# Patient Record
Sex: FEMALE | Race: WHITE | NOT HISPANIC OR LATINO | URBAN - METROPOLITAN AREA
[De-identification: names, ages, dates, MRNs, and addresses within clinical notes are randomized per-mention and may not be internally consistent; named-entity substitution may affect disease eponyms.]

---

## 2017-10-03 ENCOUNTER — APPOINTMENT (RX ONLY)
Dept: URBAN - METROPOLITAN AREA CLINIC 37 | Facility: CLINIC | Age: 27
Setting detail: DERMATOLOGY
End: 2017-10-03

## 2017-10-03 ENCOUNTER — APPOINTMENT (RX ONLY)
Dept: URBAN - METROPOLITAN AREA CLINIC 38 | Facility: CLINIC | Age: 27
Setting detail: DERMATOLOGY
End: 2017-10-03

## 2017-10-03 DIAGNOSIS — L21.8 OTHER SEBORRHEIC DERMATITIS: ICD-10-CM

## 2017-10-03 PROBLEM — J30.1 ALLERGIC RHINITIS DUE TO POLLEN: Status: ACTIVE | Noted: 2017-10-03

## 2017-10-03 PROBLEM — F41.9 ANXIETY DISORDER, UNSPECIFIED: Status: ACTIVE | Noted: 2017-10-03

## 2017-10-03 PROBLEM — K21.9 GASTRO-ESOPHAGEAL REFLUX DISEASE WITHOUT ESOPHAGITIS: Status: ACTIVE | Noted: 2017-10-03

## 2017-10-03 PROBLEM — L40.0 PSORIASIS VULGARIS: Status: ACTIVE | Noted: 2017-10-03

## 2017-10-03 PROBLEM — L20.84 INTRINSIC (ALLERGIC) ECZEMA: Status: ACTIVE | Noted: 2017-10-03

## 2017-10-03 PROBLEM — F32.9 MAJOR DEPRESSIVE DISORDER, SINGLE EPISODE, UNSPECIFIED: Status: ACTIVE | Noted: 2017-10-03

## 2017-10-03 PROCEDURE — ? COUNSELING

## 2017-10-03 PROCEDURE — ? PRESCRIPTION

## 2017-10-03 PROCEDURE — ? ORDER TESTS

## 2017-10-03 PROCEDURE — 99201: CPT

## 2017-10-03 PROCEDURE — ? TREATMENT REGIMEN

## 2017-10-03 RX ORDER — KETOCONAZOLE 20 MG/G
CREAM TOPICAL TWICE DAILY
Qty: 1 | Refills: 2 | Status: ERX | COMMUNITY
Start: 2017-10-03

## 2017-10-03 RX ORDER — FLUOCINONIDE 0.5 MG/ML
CREAM TOPICAL TWICE DAILY
Qty: 1 | Refills: 2 | Status: ERX | COMMUNITY
Start: 2017-10-03

## 2017-10-03 RX ADMIN — KETOCONAZOLE: 20 CREAM TOPICAL at 00:00

## 2017-10-03 RX ADMIN — FLUOCINONIDE: 0.5 CREAM TOPICAL at 00:00

## 2017-10-03 NOTE — PROCEDURE: TREATMENT REGIMEN
Discontinue Regimen: Betamethasone dipropionate cream (prescribed by an outside physician)
Initiate Treatment: Ketoconazole 2% cream apply topically to the affected areas of the ears twice daily\\nFluocinonide 0.05% cream apply to the affected areas of the ears twice daily
Plan: Follow up in 4-6 weeks
Detail Level: Zone

## 2017-10-03 NOTE — PROCEDURE: ORDER TESTS
Performing Laboratory: -[id number]
Billing Type: Third-Party Bill
Bill For Surgical Tray: no
Expected Date Of Service: 10/03/2017
Lab Facility: [id number]

## 2017-10-03 NOTE — PROCEDURE: MIPS QUALITY
Quality 226: Preventive Care And Screening: Tobacco Use: Screening And Cessation Intervention: Patient screened for tobacco and never smoked
Quality 130: Documentation Of Current Medications In The Medical Record: Current Medications Documented
Quality 431: Preventive Care And Screening: Unhealthy Alcohol Use - Screening: Patient screened for unhealthy alcohol use using a single question and scores less than 2 times per year
Quality 110: Preventive Care And Screening: Influenza Immunization: Influenza Immunization previously received during influenza season
Detail Level: Detailed

## 2017-10-03 NOTE — HPI: RASH (SEBORRHEIC DERMATITIS)
How Severe Is It?: moderate
Is This A New Presentation, Or A Follow-Up?: Rash
Additional History: Patient states she was seen by Dr Baez a dermatologist in [place] who states she had staph and impetigo. Patient was treated with Keflex 500 mg four times a day for 7 days and betamethasone dipropionate cream. Patient states that the infection seemed to have cleared up with the antibiotic but the topical only helps with the itching.

## 2017-10-03 NOTE — PROCEDURE: TREATMENT REGIMEN
Plan: Follow up in 4-6 weeks
Discontinue Regimen: Betamethasone dipropionate cream (prescribed by an outside physician)
Initiate Treatment: Ketoconazole 2% cream apply topically to the affected areas of the ears twice daily\\nFluocinonide 0.05% cream apply to the affected areas of the ears twice daily
Detail Level: Zone

## 2017-10-10 ENCOUNTER — RX ONLY (OUTPATIENT)
Age: 27
Setting detail: RX ONLY
End: 2017-10-10

## 2017-10-10 RX ORDER — CHLORHEXIDINE GLUCONATE 4 %
LIQUID (ML) TOPICAL ONCE A DAY
Qty: 1 | Refills: 0 | Status: ERX | COMMUNITY
Start: 2017-10-10

## 2017-10-10 RX ORDER — DOXYCYCLINE 100 MG/1
CAPSULE ORAL TWICE DAILY
Qty: 20 | Refills: 0 | Status: ERX | COMMUNITY
Start: 2017-10-10

## 2017-10-10 RX ORDER — MUPIROCIN 20 MG/G
OINTMENT TOPICAL
Qty: 1 | Refills: 0 | Status: ERX | COMMUNITY
Start: 2017-10-10

## 2017-11-06 ENCOUNTER — APPOINTMENT (RX ONLY)
Dept: URBAN - METROPOLITAN AREA CLINIC 37 | Facility: CLINIC | Age: 27
Setting detail: DERMATOLOGY
End: 2017-11-06

## 2017-11-06 ENCOUNTER — APPOINTMENT (RX ONLY)
Dept: URBAN - METROPOLITAN AREA CLINIC 38 | Facility: CLINIC | Age: 27
Setting detail: DERMATOLOGY
End: 2017-11-06

## 2017-11-06 DIAGNOSIS — L21.8 OTHER SEBORRHEIC DERMATITIS: ICD-10-CM

## 2017-11-06 PROBLEM — J30.1 ALLERGIC RHINITIS DUE TO POLLEN: Status: ACTIVE | Noted: 2017-11-06

## 2017-11-06 PROBLEM — F41.9 ANXIETY DISORDER, UNSPECIFIED: Status: ACTIVE | Noted: 2017-11-06

## 2017-11-06 PROBLEM — L20.84 INTRINSIC (ALLERGIC) ECZEMA: Status: ACTIVE | Noted: 2017-11-06

## 2017-11-06 PROBLEM — K21.9 GASTRO-ESOPHAGEAL REFLUX DISEASE WITHOUT ESOPHAGITIS: Status: ACTIVE | Noted: 2017-11-06

## 2017-11-06 PROBLEM — L40.0 PSORIASIS VULGARIS: Status: ACTIVE | Noted: 2017-11-06

## 2017-11-06 PROBLEM — F32.9 MAJOR DEPRESSIVE DISORDER, SINGLE EPISODE, UNSPECIFIED: Status: ACTIVE | Noted: 2017-11-06

## 2017-11-06 PROCEDURE — 99212 OFFICE O/P EST SF 10 MIN: CPT

## 2017-11-06 PROCEDURE — ? COUNSELING

## 2017-11-06 PROCEDURE — ? TREATMENT REGIMEN

## 2017-11-06 ASSESSMENT — LOCATION DETAILED DESCRIPTION DERM
LOCATION DETAILED: RIGHT CYMBA CONCHA
LOCATION DETAILED: LEFT ANTIHELIX
LOCATION DETAILED: LEFT ANTIHELIX
LOCATION DETAILED: RIGHT CYMBA CONCHA

## 2017-11-06 ASSESSMENT — LOCATION ZONE DERM
LOCATION ZONE: EAR
LOCATION ZONE: EAR

## 2017-11-06 ASSESSMENT — LOCATION SIMPLE DESCRIPTION DERM
LOCATION SIMPLE: RIGHT EAR
LOCATION SIMPLE: LEFT EAR
LOCATION SIMPLE: LEFT EAR
LOCATION SIMPLE: RIGHT EAR

## 2017-11-06 NOTE — PROCEDURE: COUNSELING
Detail Level: Zone
Patient Specific Counseling (Will Not Stick From Patient To Patient): For now, she will use Dandruff shampoo and use the ketoconazole cream. She should only use the steroid for flares at this point, sparingly.

## 2017-11-06 NOTE — PROCEDURE: TREATMENT REGIMEN
Detail Level: Zone
Initiate Treatment: We discussed starting a dandruff shampoo and provided samples of T-Sal shampoo.

## 2017-11-06 NOTE — PROCEDURE: COUNSELING
Patient Specific Counseling (Will Not Stick From Patient To Patient): For now, she will use Dandruff shampoo and use the ketoconazole cream. She should only use the steroid for flares at this point, sparingly.
Detail Level: Zone

## 2017-11-06 NOTE — PROCEDURE: MIPS QUALITY
Quality 226: Preventive Care And Screening: Tobacco Use: Screening And Cessation Intervention: Patient screened for tobacco and never smoked
Quality 431: Preventive Care And Screening: Unhealthy Alcohol Use - Screening: Patient screened for unhealthy alcohol use using a single question and scores less than 2 times per year
Quality 110: Preventive Care And Screening: Influenza Immunization: Influenza Immunization previously received during influenza season
Quality 130: Documentation Of Current Medications In The Medical Record: Current Medications Documented
Detail Level: Detailed

## 2021-04-14 NOTE — PROCEDURE: COUNSELING
Nystatin powder Pending    Insurance response  Prescription Drug Insurance: [employer]  Notes: Prior authorization submitted - will update provider when decision has been made by insurance.          Detail Level: Detailed

## 2023-06-18 NOTE — PROCEDURE: MIPS QUALITY
Quality 431: Preventive Care And Screening: Unhealthy Alcohol Use - Screening: Patient screened for unhealthy alcohol use using a single question and scores less than 2 times per year
Quality 130: Documentation Of Current Medications In The Medical Record: Current Medications Documented
Quality 110: Preventive Care And Screening: Influenza Immunization: Influenza Immunization previously received during influenza season
Detail Level: Detailed
Quality 226: Preventive Care And Screening: Tobacco Use: Screening And Cessation Intervention: Patient screened for tobacco and never smoked
[Fever] : no fever
[Fatigue] : no fatigue
[Discharge] : no discharge
[Pain] : no pain
[Earache] : no earache
[Sore Throat] : no sore throat
[Hoarseness] : no hoarseness
[Chest Pain] : no chest pain
[Palpitations] : no palpitations
[Lower Ext Edema] : no lower extremity edema
[Shortness Of Breath] : no shortness of breath
[Cough] : no cough
[Abdominal Pain] : no abdominal pain
[Constipation] : no constipation
[Dysuria] : no dysuria
[Melena] : no melena
[Incontinence] : no incontinence
[Hesitancy] : no hesitancy
[Nocturia] : no nocturia
[Impotence] : no impotency
[Joint Pain] : no joint pain
[Itching] : no itching
[Mole Changes] : no mole changes
[Skin Rash] : no skin rash
[Headache] : no headache
[Dizziness] : no dizziness
[Suicidal] : not suicidal
[Anxiety] : no anxiety
[Depression] : no depression
[Easy Bleeding] : no easy bleeding
[Swollen Glands] : no swollen glands
[FreeTextEntry1] : Endocrine: No increased thirst, polyuria, polydipsia
[FreeTextEntry3] : last eye exam 1/2023

## 2025-01-27 NOTE — PROCEDURE: TREATMENT REGIMEN
27-Jan-2025
Detail Level: Zone
Initiate Treatment: We discussed starting a dandruff shampoo and provided samples of T-Sal shampoo.